# Patient Record
Sex: FEMALE | Race: OTHER | Employment: UNEMPLOYED | URBAN - METROPOLITAN AREA
[De-identification: names, ages, dates, MRNs, and addresses within clinical notes are randomized per-mention and may not be internally consistent; named-entity substitution may affect disease eponyms.]

---

## 2024-05-01 ENCOUNTER — APPOINTMENT (EMERGENCY)
Dept: RADIOLOGY | Facility: HOSPITAL | Age: 74
End: 2024-05-01
Payer: COMMERCIAL

## 2024-05-01 ENCOUNTER — HOSPITAL ENCOUNTER (EMERGENCY)
Facility: HOSPITAL | Age: 74
Discharge: HOME | End: 2024-05-01
Attending: EMERGENCY MEDICINE
Payer: COMMERCIAL

## 2024-05-01 VITALS
RESPIRATION RATE: 12 BRPM | DIASTOLIC BLOOD PRESSURE: 57 MMHG | OXYGEN SATURATION: 93 % | WEIGHT: 201.06 LBS | TEMPERATURE: 98.3 F | SYSTOLIC BLOOD PRESSURE: 126 MMHG | HEART RATE: 87 BPM

## 2024-05-01 DIAGNOSIS — R53.1 GENERALIZED WEAKNESS: Primary | ICD-10-CM

## 2024-05-01 DIAGNOSIS — E11.649 DIABETIC HYPOGLYCEMIA (HCC): ICD-10-CM

## 2024-05-01 LAB
ATRIAL RATE: 85 BPM
BACTERIA UR QL AUTO: ABNORMAL /HPF
BASOPHILS # BLD AUTO: 0.03 THOUSANDS/ÂΜL (ref 0–0.1)
BASOPHILS NFR BLD AUTO: 0 % (ref 0–1)
BILIRUB UR QL STRIP: NEGATIVE
CARDIAC TROPONIN I PNL SERPL HS: <2 NG/L
CLARITY UR: ABNORMAL
COLOR UR: YELLOW
EOSINOPHIL # BLD AUTO: 0.24 THOUSAND/ÂΜL (ref 0–0.61)
EOSINOPHIL NFR BLD AUTO: 3 % (ref 0–6)
ERYTHROCYTE [DISTWIDTH] IN BLOOD BY AUTOMATED COUNT: 16.2 % (ref 11.6–15.1)
GLUCOSE SERPL-MCNC: 108 MG/DL (ref 65–140)
GLUCOSE SERPL-MCNC: 194 MG/DL (ref 65–140)
GLUCOSE SERPL-MCNC: 34 MG/DL (ref 65–140)
GLUCOSE UR STRIP-MCNC: NEGATIVE MG/DL
HCT VFR BLD AUTO: 36 % (ref 34.8–46.1)
HGB BLD-MCNC: 11.5 G/DL (ref 11.5–15.4)
HGB UR QL STRIP.AUTO: ABNORMAL
HYALINE CASTS #/AREA URNS LPF: ABNORMAL /LPF
IMM GRANULOCYTES # BLD AUTO: 0.02 THOUSAND/UL (ref 0–0.2)
IMM GRANULOCYTES NFR BLD AUTO: 0 % (ref 0–2)
KETONES UR STRIP-MCNC: NEGATIVE MG/DL
LEUKOCYTE ESTERASE UR QL STRIP: ABNORMAL
LYMPHOCYTES # BLD AUTO: 3.21 THOUSANDS/ÂΜL (ref 0.6–4.47)
LYMPHOCYTES NFR BLD AUTO: 42 % (ref 14–44)
MCH RBC QN AUTO: 25.8 PG (ref 26.8–34.3)
MCHC RBC AUTO-ENTMCNC: 31.9 G/DL (ref 31.4–37.4)
MCV RBC AUTO: 81 FL (ref 82–98)
MONOCYTES # BLD AUTO: 0.57 THOUSAND/ÂΜL (ref 0.17–1.22)
MONOCYTES NFR BLD AUTO: 8 % (ref 4–12)
MUCOUS THREADS UR QL AUTO: ABNORMAL
NEUTROPHILS # BLD AUTO: 3.5 THOUSANDS/ÂΜL (ref 1.85–7.62)
NEUTS SEG NFR BLD AUTO: 47 % (ref 43–75)
NITRITE UR QL STRIP: NEGATIVE
NON-SQ EPI CELLS URNS QL MICRO: ABNORMAL /HPF
NRBC BLD AUTO-RTO: 0 /100 WBCS
P AXIS: 31 DEGREES
PH UR STRIP.AUTO: 6 [PH] (ref 4.5–8)
PLATELET # BLD AUTO: 379 THOUSANDS/UL (ref 149–390)
PMV BLD AUTO: 10 FL (ref 8.9–12.7)
PR INTERVAL: 166 MS
PROT UR STRIP-MCNC: NEGATIVE MG/DL
QRS AXIS: -25 DEGREES
QRSD INTERVAL: 78 MS
QT INTERVAL: 396 MS
QTC INTERVAL: 471 MS
RBC # BLD AUTO: 4.45 MILLION/UL (ref 3.81–5.12)
RBC #/AREA URNS AUTO: ABNORMAL /HPF
SP GR UR STRIP.AUTO: 1.02 (ref 1–1.03)
T WAVE AXIS: -19 DEGREES
TSH SERPL DL<=0.05 MIU/L-ACNC: 0.93 UIU/ML (ref 0.45–4.5)
UROBILINOGEN UR QL STRIP.AUTO: 0.2 E.U./DL
VENTRICULAR RATE: 85 BPM
WBC # BLD AUTO: 7.57 THOUSAND/UL (ref 4.31–10.16)
WBC #/AREA URNS AUTO: ABNORMAL /HPF

## 2024-05-01 PROCEDURE — 80053 COMPREHEN METABOLIC PANEL: CPT | Performed by: EMERGENCY MEDICINE

## 2024-05-01 PROCEDURE — 82948 REAGENT STRIP/BLOOD GLUCOSE: CPT

## 2024-05-01 PROCEDURE — 71046 X-RAY EXAM CHEST 2 VIEWS: CPT

## 2024-05-01 PROCEDURE — 85025 COMPLETE CBC W/AUTO DIFF WBC: CPT | Performed by: EMERGENCY MEDICINE

## 2024-05-01 PROCEDURE — 87086 URINE CULTURE/COLONY COUNT: CPT

## 2024-05-01 PROCEDURE — 81001 URINALYSIS AUTO W/SCOPE: CPT

## 2024-05-01 PROCEDURE — 84484 ASSAY OF TROPONIN QUANT: CPT | Performed by: EMERGENCY MEDICINE

## 2024-05-01 PROCEDURE — 36415 COLL VENOUS BLD VENIPUNCTURE: CPT | Performed by: EMERGENCY MEDICINE

## 2024-05-01 PROCEDURE — 87077 CULTURE AEROBIC IDENTIFY: CPT

## 2024-05-01 PROCEDURE — 93005 ELECTROCARDIOGRAM TRACING: CPT

## 2024-05-01 PROCEDURE — 93010 ELECTROCARDIOGRAM REPORT: CPT

## 2024-05-01 PROCEDURE — 96374 THER/PROPH/DIAG INJ IV PUSH: CPT

## 2024-05-01 PROCEDURE — 84443 ASSAY THYROID STIM HORMONE: CPT | Performed by: EMERGENCY MEDICINE

## 2024-05-01 PROCEDURE — 99285 EMERGENCY DEPT VISIT HI MDM: CPT

## 2024-05-01 PROCEDURE — 99285 EMERGENCY DEPT VISIT HI MDM: CPT | Performed by: EMERGENCY MEDICINE

## 2024-05-01 PROCEDURE — 87186 SC STD MICRODIL/AGAR DIL: CPT

## 2024-05-01 RX ORDER — DEXTROSE MONOHYDRATE 25 G/50ML
INJECTION, SOLUTION INTRAVENOUS
Status: COMPLETED
Start: 2024-05-01 | End: 2024-05-01

## 2024-05-01 RX ORDER — INSULIN ASPART 100 [IU]/ML
INJECTION, SUSPENSION SUBCUTANEOUS
COMMUNITY

## 2024-05-01 RX ORDER — DEXTROSE MONOHYDRATE 25 G/50ML
INJECTION, SOLUTION INTRAVENOUS
Status: DISCONTINUED
Start: 2024-05-01 | End: 2024-05-01 | Stop reason: HOSPADM

## 2024-05-01 RX ORDER — INSULIN GLARGINE 100 [IU]/ML
10 INJECTION, SOLUTION SUBCUTANEOUS
COMMUNITY

## 2024-05-01 RX ORDER — LOSARTAN POTASSIUM 100 MG/1
100 TABLET ORAL DAILY
COMMUNITY

## 2024-05-01 RX ORDER — DEXTROSE MONOHYDRATE 25 G/50ML
50 INJECTION, SOLUTION INTRAVENOUS ONCE
Status: COMPLETED | OUTPATIENT
Start: 2024-05-01 | End: 2024-05-01

## 2024-05-01 RX ORDER — HYDROCHLOROTHIAZIDE 25 MG/1
25 TABLET ORAL DAILY
COMMUNITY

## 2024-05-01 RX ORDER — AMLODIPINE BESYLATE 5 MG/1
5 TABLET ORAL DAILY
COMMUNITY

## 2024-05-01 RX ORDER — METOPROLOL SUCCINATE 25 MG/1
25 TABLET, EXTENDED RELEASE ORAL DAILY
COMMUNITY

## 2024-05-01 RX ADMIN — DEXTROSE MONOHYDRATE 50 ML: 25 INJECTION, SOLUTION INTRAVENOUS at 13:16

## 2024-05-01 NOTE — ED PROVIDER NOTES
History  Chief Complaint   Patient presents with    Weakness - Generalized     Patient reports generalized weakness x2 weeks. Denies abdominal pain/dizziness/chest pain     73y F w/ IDDM here for evaluation of generalized malaise, weakness, and fatigue.   translated for patient (declined cyracom) and reports pt returned from a trip to [place] about two weeks ago and was sick w/ URI symptoms (cough/congestion).  Those symptoms have improved, but pt is persistently fatigued and generally weak.    Denies f/c/s, no ha, no congestion.  Does have a mild, intermittent, persistent cough. Denies cp/pressure, no sob/wilburn, no abd pain. Appetite has been up/down.  No n/v/d, no dysuria, frequency or urgency.  Denies any changes to speech/vision, no focal numbness or weakness.  has noted some intermittent swelling to the LEs.  reports pt taking meds/insulin as prescribed.      History provided by:  Patient and spouse   used: Yes (spouse)        Prior to Admission Medications   Prescriptions Last Dose Informant Patient Reported? Taking?   amLODIPine (NORVASC) 5 mg tablet 5/1/2024 Self Yes Yes   Sig: Take 5 mg by mouth daily   hydroCHLOROthiazide 25 mg tablet 5/1/2024 Self Yes Yes   Sig: Take 25 mg by mouth daily   insulin aspart protamine-insulin aspart (NovoLOG 70/30) 100 units/mL injection 5/1/2024 Self Yes Yes   Sig: Inject under the skin 3 (three) times a day with meals   insulin glargine (LANTUS) 100 units/mL subcutaneous injection 4/30/2024 Self Yes Yes   Sig: Inject 10 Units under the skin daily at bedtime   losartan (COZAAR) 100 MG tablet 5/1/2024 Self Yes Yes   Sig: Take 100 mg by mouth daily   metoprolol succinate (TOPROL-XL) 25 mg 24 hr tablet 5/1/2024 Self Yes Yes   Sig: Take 25 mg by mouth daily   sitaGLIPtin-metFORMIN (JANUMET)  MG per tablet 5/1/2024 Self Yes Yes   Sig: Take 1 tablet by mouth 2 (two) times a day with meals      Facility-Administered Medications: None        Past Medical History:   Diagnosis Date    Diabetes mellitus (HCC)        History reviewed. No pertinent surgical history.    History reviewed. No pertinent family history.  I have reviewed and agree with the history as documented.    E-Cigarette/Vaping     E-Cigarette/Vaping Substances     Social History     Tobacco Use    Smoking status: Never    Smokeless tobacco: Never   Substance Use Topics    Alcohol use: Never    Drug use: Never       Review of Systems   All other systems reviewed and are negative.      Physical Exam  Physical Exam  Vitals and nursing note reviewed.   Constitutional:       General: She is not in acute distress.     Appearance: Normal appearance. She is not ill-appearing, toxic-appearing or diaphoretic.   HENT:      Mouth/Throat:      Mouth: Mucous membranes are moist.   Eyes:      Extraocular Movements: Extraocular movements intact.      Conjunctiva/sclera: Conjunctivae normal.      Pupils: Pupils are equal, round, and reactive to light.   Cardiovascular:      Rate and Rhythm: Normal rate and regular rhythm.      Heart sounds: No murmur heard.  Pulmonary:      Effort: Pulmonary effort is normal. No respiratory distress.      Breath sounds: Normal breath sounds. No stridor. No wheezing, rhonchi or rales.   Chest:      Chest wall: No tenderness.   Abdominal:      General: There is no distension.      Palpations: Abdomen is soft.      Tenderness: There is no abdominal tenderness. There is no guarding or rebound.   Musculoskeletal:      Cervical back: Normal range of motion.      Right lower leg: Edema present.      Left lower leg: Edema present.      Comments: Trace b/l pretibial pitting edema   Neurological:      General: No focal deficit present.      Mental Status: She is alert and oriented to person, place, and time.      Sensory: No sensory deficit.      Motor: No weakness.   Psychiatric:         Mood and Affect: Mood normal.         Vital Signs  ED Triage Vitals [05/01/24 1217]    Temperature Pulse Respirations Blood Pressure SpO2   98.3 °F (36.8 °C) 86 18 140/66 97 %      Temp Source Heart Rate Source Patient Position - Orthostatic VS BP Location FiO2 (%)   Oral Monitor Sitting Right arm --      Pain Score       --           Vitals:    05/01/24 1217 05/01/24 1320   BP: 140/66 126/57   Pulse: 86 87   Patient Position - Orthostatic VS: Sitting          Visual Acuity      ED Medications  Medications   dextrose 50 % IV solution 50 mL ( Intravenous Canceled Entry 5/1/24 1319)       Diagnostic Studies  Results Reviewed       Procedure Component Value Units Date/Time    Urine Microscopic [[id number]]  (Abnormal) Collected: 05/01/24 1428    Lab Status: Final result Specimen: Urine, Clean Catch Updated: 05/01/24 1440     RBC, UA 2-4 /hpf      WBC, UA 30-50 /hpf      Epithelial Cells Occasional /hpf      Bacteria, UA Moderate /hpf      MUCUS THREADS Moderate     Hyaline Casts, UA 0-3 /lpf     Urine culture [[id number]] Collected: 05/01/24 1428    Lab Status: In process Specimen: Urine, Clean Catch Updated: 05/01/24 1440    Fingerstick Glucose (POCT) [[id number]]  (Normal) Collected: 05/01/24 1436    Lab Status: Final result Specimen: Blood Updated: 05/01/24 1437     POC Glucose 108 mg/dl     Urine Macroscopic, POC [[id number]]  (Abnormal) Collected: 05/01/24 1428    Lab Status: Final result Specimen: Urine Updated: 05/01/24 1429     Color, UA Yellow     Clarity, UA Cloudy     pH, UA 6.0     Leukocytes, UA Small     Nitrite, UA Negative     Protein, UA Negative mg/dl      Glucose, UA Negative mg/dl      Ketones, UA Negative mg/dl      Urobilinogen, UA 0.2 E.U./dl      Bilirubin, UA Negative     Occult Blood, UA Trace     Specific Gravity, UA 1.025    Narrative:      CLINITEK RESULT    Fingerstick Glucose (POCT) [[id number]]  (Abnormal) Collected: 05/01/24 1335    Lab Status: Final result Specimen: Blood Updated: 05/01/24 1337     POC Glucose 194 mg/dl     Comprehensive metabolic panel [[id number]]   (Abnormal) Collected: 05/01/24 1240    Lab Status: Final result Specimen: Blood from Arm, Right Updated: 05/01/24 1335     Sodium 141 mmol/L      Potassium 3.3 mmol/L      Chloride 107 mmol/L      CO2 25 mmol/L      ANION GAP 9 mmol/L      BUN 22 mg/dL      Creatinine 1.21 mg/dL      Glucose 45 mg/dL      Calcium 10.0 mg/dL      AST 32 U/L      ALT 32 U/L      Alkaline Phosphatase 68 U/L      Total Protein 8.4 g/dL      Albumin 4.2 g/dL      Total Bilirubin 0.35 mg/dL      eGFR 44 ml/min/1.73sq m     Narrative:      National Kid[employer] guidelines for Chronic Kidney Disease (CKD):     Stage 1 with normal or high GFR (GFR > 90 mL/min/1.73 square meters)    Stage 2 Mild CKD (GFR = 60-89 mL/min/1.73 square meters)    Stage 3A Moderate CKD (GFR = 45-59 mL/min/1.73 square meters)    Stage 3B Moderate CKD (GFR = 30-44 mL/min/1.73 square meters)    Stage 4 Severe CKD (GFR = 15-29 mL/min/1.73 square meters)    Stage 5 End Stage CKD (GFR <15 mL/min/1.73 square meters)  Note: GFR calculation is accurate only with a steady state creatinine    TSH, 3rd generation with Free T4 reflex [[id number]]  (Normal) Collected: 05/01/24 1240    Lab Status: Final result Specimen: Blood from Arm, Right Updated: 05/01/24 1328     TSH 3RD GENERATON 0.932 uIU/mL     HS Troponin 0hr (reflex protocol) [[id number]]  (Normal) Collected: 05/01/24 1240    Lab Status: Final result Specimen: Blood from Arm, Right Updated: 05/01/24 1320     hs TnI 0hr <2 ng/L     Fingerstick Glucose (POCT) [[id number]]  (Abnormal) Collected: 05/01/24 1311    Lab Status: Final result Specimen: Blood Updated: 05/01/24 1316     POC Glucose 34 mg/dl     CBC and differential [[id number]]  (Abnormal) Collected: 05/01/24 1240    Lab Status: Final result Specimen: Blood from Arm, Right Updated: 05/01/24 1250     WBC 7.57 Thousand/uL      RBC 4.45 Million/uL      Hemoglobin 11.5 g/dL      Hematocrit 36.0 %      MCV 81 fL      MCH 25.8 pg      MCHC 31.9 g/dL      RDW  16.2 %      MPV 10.0 fL      Platelets 379 Thousands/uL      nRBC 0 /100 WBCs      Segmented % 47 %      Immature Grans % 0 %      Lymphocytes % 42 %      Monocytes % 8 %      Eosinophils Relative 3 %      Basophils Relative 0 %      Absolute Neutrophils 3.50 Thousands/µL      Absolute Immature Grans 0.02 Thousand/uL      Absolute Lymphocytes 3.21 Thousands/µL      Absolute Monocytes 0.57 Thousand/µL      Eosinophils Absolute 0.24 Thousand/µL      Basophils Absolute 0.03 Thousands/µL                    XR chest 2 views   ED Interpretation by Yesika Cardenas DO (05/01 1352)   Xray reviewed and independently interpreted by me: no acute findings        Final Result by Corona Nova MD (05/02 0843)      No acute cardiopulmonary disease.            Workstation performed: [id number]                    Procedures  ECG 12 Lead Documentation Only    Date/Time: 5/1/2024 12:52 PM    Performed by: Yesika Cardenas DO  Authorized by: Yesika Cardenas DO    Indications / Diagnosis:  Weakness  ECG reviewed by me, the ED Provider: yes    Patient location:  ED  Previous ECG:     Previous ECG:  Unavailable  Interpretation:     Interpretation: non-specific    Rate:     ECG rate:  85    ECG rate assessment: normal    Rhythm:     Rhythm: sinus rhythm    QRS:     QRS axis:  Left  ST segments:     ST segments:  Non-specific  T waves:     T waves: non-specific    Other findings:     Other findings: poor R wave progression             ED Course  ED Course as of 05/02/24 1441   Wed May 01, 2024   1308 Hemoglobin: 11.5  No old for comparison   1318 POC Glucose(!!): 34  Pt reportedly complained to nursing about feeling like blood sugar was low, so POCT glucose checked.  D50 administered, food ordered.   1319 Pt reports she gave herself her insulin this am but didn't eat breakfast b/c she wasn't feeling well.  Reinforced to patient that it is very important that she eats if she is going to give herself her insulin   1352 hs TnI 0hr: <2   1352  TSH 3RD GENERATON: 0.932   1418 Pt had sandwich, chips, crackers/peanut butter and feels better.      Received the D50 about an hour ago.  Will recheck blood sugar.      Still awaiting urine specimen   1435 W/u unremarkable.  Discussed w/ pt and  importance of eating if she is giving herself her novolog.                               SBIRT 22yo+      Flowsheet Row Most Recent Value   Initial Alcohol Screen: US AUDIT-C     1. How often do you have a drink containing alcohol? 0 Filed at: 05/01/2024 1410   2. How many drinks containing alcohol do you have on a typical day you are drinking?  0 Filed at: 05/01/2024 1410   3b. FEMALE Any Age, or MALE 65+: How often do you have 4 or more drinks on one occassion? 0 Filed at: 05/01/2024 1410   Audit-C Score 0 Filed at: 05/01/2024 1410   MALGORZATA: How many times in the past year have you...    Used an illegal drug or used a prescription medication for non-medical reasons? Never Filed at: 05/01/2024 1410                      Medical Decision Making  Vague symptoms w/ broad differential.  Will get EKG to r/o arrhythmia, ischemic changes.  Will get labs to r/o acute life threatening metabolic abnl, cardiac ischemia, significant anemia.  Will get CXR to r/o occult pna, will get UA to r/o occult infection.      Problems Addressed:  Diabetic hypoglycemia (HCC): acute illness or injury that poses a threat to life or bodily functions  Generalized weakness: acute illness or injury    Amount and/or Complexity of Data Reviewed  Independent Historian: spouse  Labs: ordered. Decision-making details documented in ED Course.  Radiology: ordered and independent interpretation performed.  ECG/medicine tests: ordered and independent interpretation performed.    Risk  Prescription drug management.             Disposition  Final diagnoses:   Generalized weakness   Diabetic hypoglycemia (HCC)     Time reflects when diagnosis was documented in both MDM as applicable and the Disposition within  this note       Time User Action Codes Description Comment    5/1/2024  2:35 PM Yesika Cardenas [R53.1] Generalized weakness     5/1/2024  2:36 PM Yesika Cardenas [E11.649] Diabetic hypoglycemia (HCC)           ED Disposition       ED Disposition   Discharge    Condition   Stable    Date/Time   Wed May 1, 2024 1435    Comment   Myla Joseph discharge to home/self care.                   Follow-up Information    None         Discharge Medication List as of 5/1/2024  2:48 PM        CONTINUE these medications which have NOT CHANGED    Details   amLODIPine (NORVASC) 5 mg tablet Take 5 mg by mouth daily, Historical Med      hydroCHLOROthiazide 25 mg tablet Take 25 mg by mouth daily, Historical Med      insulin aspart protamine-insulin aspart (NovoLOG 70/30) 100 units/mL injection Inject under the skin 3 (three) times a day with meals, Historical Med      insulin glargine (LANTUS) 100 units/mL subcutaneous injection Inject 10 Units under the skin daily at bedtime, Historical Med      losartan (COZAAR) 100 MG tablet Take 100 mg by mouth daily, Historical Med      metoprolol succinate (TOPROL-XL) 25 mg 24 hr tablet Take 25 mg by mouth daily, Historical Med      sitaGLIPtin-metFORMIN (JANUMET)  MG per tablet Take 1 tablet by mouth 2 (two) times a day with meals, Historical Med             No discharge procedures on file.    PDMP Review       None            ED Provider  Electronically Signed by             Yesika Cardenas DO  05/02/24 7731

## 2024-05-01 NOTE — ED NOTES
This writer at patients bedside after being notified of low blood glucose, patient diaphoretic, slow to respond but awake. Reports taking insulin this morning but not eating due to poor appetite. Patient given IV dextrose, vital signs obtained, provided with snack and meal tray ordered. MD at bedside to reassess patient.      Doris Grodon RN  05/01/24 3818

## 2024-05-01 NOTE — ED NOTES
Nursing staff and doctor informed of patients blood sugar level of 34mg/dl     Jose Eduardo Gibbs  05/01/24 1321       Jose Eduardo Gibbs  05/01/24 1320

## 2024-05-01 NOTE — DISCHARGE INSTRUCTIONS
"Román reveles ou soco webster presjori. Asire w ou bay tèt ou sèlman ensilin aspart protamine-insulin aspart (NovoLOG 70/30) 100 inite/mL piki sonja yo preskri \"malcolm armenta\" si w manje. Si ou bay tèt ou dòz ensilin sa a epi ou pa manje, ou riske fè tansyon ou bridger tijerina.    Alphonse Delgadillo Swen P[place]ipal ou a si ou jeneralize feblès malcolm riley.  "

## 2024-05-03 LAB — BACTERIA UR CULT: ABNORMAL

## 2024-05-05 NOTE — RESULT ENCOUNTER NOTE
Using  #[id number], Priscilla. Attempted to call regarding UC results. No answer LMOM to call back ED. GFR 44

## 2024-05-08 LAB
ALBUMIN SERPL BCP-MCNC: 4.2 G/DL (ref 3.5–5)
ALP SERPL-CCNC: 68 U/L (ref 34–104)
ALT SERPL W P-5'-P-CCNC: 32 U/L (ref 7–52)
ANION GAP SERPL CALCULATED.3IONS-SCNC: 9 MMOL/L (ref 4–13)
AST SERPL W P-5'-P-CCNC: 32 U/L (ref 13–39)
BILIRUB SERPL-MCNC: 0.35 MG/DL (ref 0.2–1)
BUN SERPL-MCNC: 22 MG/DL (ref 5–25)
CALCIUM SERPL-MCNC: 10 MG/DL (ref 8.4–10.2)
CHLORIDE SERPL-SCNC: 107 MMOL/L (ref 96–108)
CO2 SERPL-SCNC: 25 MMOL/L (ref 21–32)
CREAT SERPL-MCNC: 1.21 MG/DL (ref 0.6–1.3)
GFR SERPL CREATININE-BSD FRML MDRD: 44 ML/MIN/1.73SQ M
GLUCOSE SERPL-MCNC: 45 MG/DL (ref 65–140)
POTASSIUM SERPL-SCNC: 3.3 MMOL/L (ref 3.5–5.3)
PROT SERPL-MCNC: 8.4 G/DL (ref 6.4–8.4)
SODIUM SERPL-SCNC: 141 MMOL/L (ref 135–147)